# Patient Record
Sex: FEMALE | Race: WHITE
[De-identification: names, ages, dates, MRNs, and addresses within clinical notes are randomized per-mention and may not be internally consistent; named-entity substitution may affect disease eponyms.]

---

## 2018-11-27 ENCOUNTER — HOSPITAL ENCOUNTER (OUTPATIENT)
Dept: HOSPITAL 74 - FASU | Age: 83
Discharge: HOME | End: 2018-11-27
Attending: OPHTHALMOLOGY
Payer: COMMERCIAL

## 2018-11-27 VITALS — DIASTOLIC BLOOD PRESSURE: 50 MMHG | SYSTOLIC BLOOD PRESSURE: 120 MMHG | HEART RATE: 72 BPM | TEMPERATURE: 98 F

## 2018-11-27 VITALS — BODY MASS INDEX: 24.7 KG/M2

## 2018-11-27 DIAGNOSIS — H02.89: Primary | ICD-10-CM

## 2018-11-27 DIAGNOSIS — C44.1192: ICD-10-CM

## 2018-11-27 PROCEDURE — 0KX10ZZ TRANSFER FACIAL MUSCLE, OPEN APPROACH: ICD-10-PCS | Performed by: OPHTHALMOLOGY

## 2018-11-27 PROCEDURE — 0JX10ZC TRANSFER FACE SUBCUTANEOUS TISSUE AND FASCIA WITH SKIN, SUBCUTANEOUS TISSUE AND FASCIA, OPEN APPROACH: ICD-10-PCS | Performed by: OPHTHALMOLOGY

## 2018-11-27 PROCEDURE — 08BR0ZZ EXCISION OF LEFT LOWER EYELID, OPEN APPROACH: ICD-10-PCS | Performed by: OPHTHALMOLOGY

## 2018-11-28 NOTE — OP
DATE OF OPERATION:  11/27/2018

 

PREOPERATIVE DIAGNOSIS:  Extensive defect, left lower lid involving the lateral 60%

to 70% of the eyelid and the anterior lamella including orbicularis down to septum.

 

POSTOPERATIVE DIAGNOSIS:  Extensive defect, left lower lid involving the lateral 60%

to 70% of the eyelid and the anterior lamella including orbicularis down to septum.

 

PROCEDURE:

1.  Reconstruction of the extensive defect, left lower lid with debridement and

tailoring.

2.  Extensive skin loss advancement flap from the lateral canthus and temple and

cheek to the left lower lid.

3.  Skin/muscle advancement flap from the nasal left lower lid to the lateral left

lower lid.

4.  Lateral tarsal strip, left lower lid.

5.  Lateral canthoplasty, left.

6.  _____ suture.

 

SURGEON:  Luís White MD

 

ANESTHESIA:  General with LMA.

 

COMPLICATIONS:  None.

 

ESTIMATED BLOOD LOSS:  20 mL

 

DESCRIPTION OF OPERATION:  Patient brought to the operating room, placed on the

operating room table.  Vital signs were monitored by Anesthesia.  Tetracaine was

placed in both eyes.  The wound was photographed _____, and a timeout was performed. 

Following which, the patient was placed under LMA anesthesia.  Possible interventions

included a large rhomboid rotational flap from the lower cheek.  However, since the

defect involved both the orbicularis and the skin, leaving only septum and posterior

lamella intact, it was felt that a better approach would be a large skin-muscle

advancement flap of relatively similar tissue.  Cheek tissue was quite prominent. 

Therefore, a high-arched, lateral canthal line was marked at the lateral canthus as

was a rhomboid, and the area of the cheek and the lateral canthus and lateral temple

and cheek were diffusely injected with 2% xylocaine, 1:100,000 epinephrine for a

total of 10 mL as was the lateral canthus and the nasal portion of the lower lid as

well.  Patient was prepped and draped in usual sterile fashion, exposing both eyes

and the left ear in conscious sedation of the need for a skin graft became obvious. 

The right eye was taped closed with Steri-Strips, and a skin/muscle flap was

developed just lateral to the left lower lid, extending in high-arch fashion as one

would for a Tenzel flap but more extensive, closer to a _____-type incision. 

Skin/muscle flap was then developed widely with gentle spreading and hemostasis was

achieved with [place] needle as needed.  Skin/muscle flap was then advanced.  The

skin _____ cheek flap.  The nasal skin/muscle flap was then developed in the

pretarsal space by incising subciliary and advancing that flap as much as could be

advanced temporally.  The large, extensive temple flap was now brought to meet the

nasal flap and was secured with subcuticular 5-0 and 4-0 chromic sutures.  Being

careful not to place any tension vertically on the lower eyelid.  Lower eyelid as

quite lax and was noted to be lax and somewhat ectropic at the beginning of the case,

and it was felt that without repair of this laxity, the eyelid would become further

ectropic, and therefore, a lateral tarsal strip was developed by incising lateral

canthus,  the inferior lexie from the orbital rim with sharp dissection,

overlapping it, dividing into an anterior and posterior lamella, excising the

anterior lamella, denuding the posterior lamella off the epithelium posteriorly and

superiorly.  A lateral tarsal strip and then suturing this to the internal surface of

the orbital rim at the junction with the superior lexie of the lateral canthal tendon,

elevating, and tightening this lid, reinforcing this with two 6-0 Vicryl lasso

sutures and reforming the lateral canthus with a buried 5-0 chromic suture to the

gray line of the upper and lower lid.  This completed the lateral tarsal strip. 

Meticulous attention was now developed toward closing the 2 flaps of the nasal and

the temporal myocutaneous flap as well as the cheek donor site which was closed with

subcuticular 4-0 and 5-0 chromic, removing a standing cutaneous deformity inferiorly.

 A lateral myocutaneous flap was meticulously tailored now with interrupted 6-0 plain

suture, recreating the lateral canthus.  The flap had to be reattached to the lower

lid skin and to the upper lid skin, to the lateral canthal new commissure. 

Therefore, an entire lateral canthoplasty was created with meticulous attention to

contour.  Lateral to this, the deep tissues were closed with 5-0 chromic suture, and

the standing cutaneous deformity was excised at the temporal end of the advancement

flap and the skin was closed with running 5-0 plain suture and reinforced with

interrupted 6-0 plain suture where needed.  The donor site in the cheek was closed

with running and interrupted 5-0 chromic suture and 5-0 plain suture and then running

interrupted 5-0 plain suture, then interrupted 5-0 plain sutures near the superior

end of the flap.  At the close of the procedure, the lower lid was in excellent

position, well opposed to the globe, with good tension, without undue vertical

tension.  There was excellent appearance of the reconstructed left lower lid without

excess thickening.  A double-arm 4-0 silk passed through a number 8-French bolster,

was now passed through the skin and gray line of the lower lid lateral to the lateral

limbus and secured to the forehead with Mastisol and Steri-Strips were placed on the

lid on stretch during the healing process.  Erythromycin was placed in the eye, on

all the sutures, and the patient was taken to the recovery room after being awakened

from anesthesia.

 

 

LUÍS WHITE M.D.

 

[id number]

DD: 11/27/2018 12:39

DT: 11/27/2018 15:43

Job #:  [id number]